# Patient Record
Sex: FEMALE | Race: WHITE | NOT HISPANIC OR LATINO
[De-identification: names, ages, dates, MRNs, and addresses within clinical notes are randomized per-mention and may not be internally consistent; named-entity substitution may affect disease eponyms.]

---

## 2017-05-22 PROBLEM — Z00.00 ENCOUNTER FOR PREVENTIVE HEALTH EXAMINATION: Status: ACTIVE | Noted: 2017-05-22

## 2018-02-20 ENCOUNTER — APPOINTMENT (OUTPATIENT)
Dept: SURGERY | Facility: CLINIC | Age: 58
End: 2018-02-20
Payer: COMMERCIAL

## 2018-02-20 VITALS
WEIGHT: 209 LBS | HEART RATE: 91 BPM | TEMPERATURE: 98.5 F | HEIGHT: 67 IN | SYSTOLIC BLOOD PRESSURE: 135 MMHG | DIASTOLIC BLOOD PRESSURE: 79 MMHG | OXYGEN SATURATION: 98 % | BODY MASS INDEX: 32.8 KG/M2

## 2018-02-20 DIAGNOSIS — Z87.09 PERSONAL HISTORY OF OTHER DISEASES OF THE RESPIRATORY SYSTEM: ICD-10-CM

## 2018-02-20 PROCEDURE — 99202 OFFICE O/P NEW SF 15 MIN: CPT

## 2018-02-20 RX ORDER — ALBUTEROL SULFATE 2.5 MG/.5ML
SOLUTION RESPIRATORY (INHALATION)
Refills: 0 | Status: ACTIVE | COMMUNITY

## 2018-04-13 ENCOUNTER — APPOINTMENT (OUTPATIENT)
Dept: ORTHOPEDIC SURGERY | Facility: CLINIC | Age: 58
End: 2018-04-13

## 2018-04-27 ENCOUNTER — APPOINTMENT (OUTPATIENT)
Dept: ORTHOPEDIC SURGERY | Facility: CLINIC | Age: 58
End: 2018-04-27

## 2018-06-07 ENCOUNTER — APPOINTMENT (OUTPATIENT)
Dept: ORTHOPEDIC SURGERY | Facility: CLINIC | Age: 58
End: 2018-06-07

## 2018-12-10 ENCOUNTER — APPOINTMENT (OUTPATIENT)
Dept: ORTHOPEDIC SURGERY | Facility: CLINIC | Age: 58
End: 2018-12-10
Payer: MEDICAID

## 2018-12-10 VITALS
WEIGHT: 199 LBS | BODY MASS INDEX: 31.23 KG/M2 | SYSTOLIC BLOOD PRESSURE: 130 MMHG | HEIGHT: 67 IN | DIASTOLIC BLOOD PRESSURE: 90 MMHG | HEART RATE: 105 BPM

## 2018-12-10 DIAGNOSIS — Z87.891 PERSONAL HISTORY OF NICOTINE DEPENDENCE: ICD-10-CM

## 2018-12-10 PROCEDURE — 99204 OFFICE O/P NEW MOD 45 MIN: CPT

## 2018-12-10 PROCEDURE — 73030 X-RAY EXAM OF SHOULDER: CPT | Mod: RT

## 2018-12-10 RX ORDER — DICLOFENAC SODIUM 50 MG/1
50 TABLET, DELAYED RELEASE ORAL
Qty: 60 | Refills: 1 | Status: ACTIVE | COMMUNITY
Start: 2018-12-10 | End: 1900-01-01

## 2018-12-10 RX ORDER — BUDESONIDE AND FORMOTEROL FUMARATE DIHYDRATE 80; 4.5 UG/1; UG/1
80-4.5 AEROSOL RESPIRATORY (INHALATION)
Refills: 0 | Status: ACTIVE | COMMUNITY

## 2018-12-31 ENCOUNTER — APPOINTMENT (OUTPATIENT)
Dept: ORTHOPEDIC SURGERY | Facility: CLINIC | Age: 58
End: 2018-12-31

## 2019-01-09 ENCOUNTER — APPOINTMENT (OUTPATIENT)
Dept: ORTHOPEDIC SURGERY | Facility: CLINIC | Age: 59
End: 2019-01-09

## 2019-01-11 ENCOUNTER — APPOINTMENT (OUTPATIENT)
Dept: ORTHOPEDIC SURGERY | Facility: CLINIC | Age: 59
End: 2019-01-11
Payer: MEDICAID

## 2019-01-11 PROCEDURE — 99214 OFFICE O/P EST MOD 30 MIN: CPT

## 2019-01-11 NOTE — DISCUSSION/SUMMARY
[de-identified] : Right shoulder traumatic rotator cuff tear with impingement syndrome and biceps tendinitis\par \par We discussed at length the anatomy, function and tear pattern of the rotator cuff using models, diagrams and drawings. We reviewed the patient's physical exam, radiographic images and history. We discussed the expected outcome of non-operative conservative treatment versus arthroscopic operative rotator cuff repair. Non-operative management consists of patient education, activity modification, corticosteroid injection, PO or topical NSAIDs, and formal physical therapy. Partial thickness tears do have some limited healing potential but infrequently heal completely and with time sometimes progress towards a focal full thickness tear. Smaller tears without retraction are expected to progress and enlarge overtime to larger full thickness tears with retraction. Full thickness rotator cuff tears, in a vast majority of circumstances, do not heal without surgical treatment. The larger the tear becomes, the more difficult the repair is technically and protracts and slows rehabilitation. As the full thickness rotator cuff tear progresses over time, the torn tendon edge retracted due to intrinsic tendon changes to its strength and elasticity, which along with progressive rotator cuff muscle belly atrophy and fatty degeneration makes surgical management both less effective and more difficult. Given enough chronic tendon changes and degenerative muscle changes, the cuff may become unrepairable, necessitating larger, more costly, less predictable poorer outcome, reconstructive arthroscopic or open surgeries including a reverse shoulder replacement.\par \par Physical therapy was prescribed to restore shoulder range of motion preoperative\par \par We'll plan for surgery on January 24 for rotator cuff repair, biceps tenodesis, subacromial decompression\par \par \par The patient verifies their understanding the the visit, diagnosis and plan. They agree with the treatment plan and will contact the office with any questions or problems.\par Follow up\par Patient followup up one week prior to surgery for final discussion

## 2019-01-11 NOTE — HISTORY OF PRESENT ILLNESS
[de-identified] : CC right shoulder\par \par HPI 55 yo female right HD presents with acute onset of on the week of constant pain in the lateral right shoulder after a fall in the bathroom injuring the right shoulder. The pain is same, and rated a 15 out of 10, described as sharp aching burning throat, with radiation to the hand back and neck. Nothing makes the pain better and anything makes the pain worse. The patient reports associated symptoms of instability weakness swelling. The patient + pain at night affecting sleep, + neck pain, and - similar pain previously.\par \par The patient has tried the following treatments:\par Activity modification	+\par Ice			+\par Nsaids    		+\par Physical Therapy  	-\par Cortisone Injection	-\par Arthroscopy/Surgery	-\par \par Review of Systems is positive for the above musculoskeletal symptoms and is otherwise non-contributory for general, constitutional, psychiatric, neurologic, HEENT, cardiac, respiratory, gastrointestinal, reproductive, lymphatic, and dermatologic complaints.\par \par Consult by Dr Chrissy Lombardo

## 2019-01-11 NOTE — PHYSICAL EXAM
[de-identified] : Physical Examination\par General: well nourished, in no acute distress, alert and oriented to person, place and time\par Psychiatric: normal mood and affect, no abnormal movements or speech patterns\par Eyes: vision intact - glasses\par Throat: no thyromegaly\par Lymph: no enlarged nodes, no lymphedema in extremity\par Respiratory: no wheezing, no shortness of breath with ambulation\par Cardiac: no cardiac leg swelling, 2+ peripheral pulses\par Neurology: normal gross sensation in extremities to light touch\par Abdomen: soft, non-tender, tympanic, no masses\par \par Musculoskeletal Examination\par Cervical spine	Full painless range of motion and negative Spurling's test\par \par Shoulder			Right			Left\par Appearance\par      Skin/Swelling/Deformity	normal			normal\par      Scapular Winging		-			-\par Range of Motion\par      Forward Flexion		70 / 120		170 / 170\par      Abduction			20 / 120		170 / 170\par      External Rotation		35			45\par      Internal Rotation		lateral hip			T10\par      SAbd Ext Rotation		90&			90\par      SAbd Int Rotation		80&			80\par      Painful Arc			+			-\par      Crepitus			+			-\par Palpation\par      Clavicle			-			-\par      AC Joint			-			-\par      Posterior Acromion		-			-\par      Levator Scapula		+			-\par      Lateral Bursa			+			-\par      Impingement Area		+			-\par      Biceps Tendon		+			-\par      Anterior Capsule		+			-\par Strength Examination\par      Supraspinatous 		3+ / +			5+ / 0\par      Infraspinatous			3+ / +			5+ / 0\par      Subscapularis			4+ / +			5+ / 0\par      Belly Press			4+ / +			5+ / 0\par      Lift Off			-&			-\par      Drop-Arm			+			-\par Special Examination\par      Biceps Condon's		-&			-\par      Impingement Neer		-&			-\par      Impingement Hawking		-&			-\par \par      AC Cross-Body\par           Anterior			-&			-\par           Posterior			-&			-\par \par Sensation\par      Axillary			normal			normal\par      LatAntCubBrach 		normal			normal\par      Median 			normal			normal\par      Ulnar 			normal			normal\par      Radial 			normal			normal\par Motor\par      AIN 				normal			normal\par      Ulnar 			normal			normal\par      Radial 			normal			normal\par      PIN 				normal			normal\par Pulses\par      Radial			2+			2+ [de-identified] : 4 views of the affected right shoulder (AP, Glenoid, Y-View, Axillary)\par 12-10-18\par demonstrate:\par normal bony calcification without dislocation and no fracture\par 	Arch	2B\par 	AC Joint	no Arthrosis\par 	GH Joint	trace Arthrosis\par 	Calcifications	none\par \par MRI right shoulder from 1/4-19 on [facility]\par My impression of the images:\par Quality of the MRI is okay with motion artifact\par Supraspinatous Tendon full-thickness avulsion tear to the glenohumeral joint\par Infraspinatous Tendon high grade partial thickness tear\par Subscapularis Tendon ok\par Teres Minor Tendon ok\par Muscle Belly Atrophy none\par Biceps Tendon is in the groove and looks ok intra-articularly but poorly visualized with a stable attachment anchor\par Superior Labrum ok\par Anterior Labrum ok\par Posterior Labrum ok\par AC joint mild signal\par There is no full thickness chondral lesion of the glenoid and humeral head\par \par The Final Radiologist Impression:\par Acromion and bursa: The acromion has a curved configuration (type II). Moderate\par degenerative changes are present at the acromioclavicular joint. A moderate amount of\par fluid is present in the subacromial/subdeltoid bursa.\par Rotator cuff tendons: A full-thickness tear is present involving the anterior 1.7 cm of\par the distal supraspinatus tendon. Infraspinatus stenosis is present with high-grade\par partial-thickness articular sided tearing. There is also subscapularis tendinosis with\par partial tearing at its insertion.\par Biceps tendons: Mild tendinosis of the intra-articular portion long head of the biceps\par tendon is present.\par Cartilage and labrum: No full-thickness cartilage defects are present. No overt labral\par tears are identified.\par Bone marrow: The bone marrow signal is overall age appropriate.\par There is no evidence for significant lymphadenopathy in the visualized portions of the\par axilla. No asymmetric muscle atrophy is present.\par IMPRESSION:\par Full-thickness tear involving the anterior aspect of the distal supraspinatus tendon.\par M75.121\par High-grade partial tear of the infraspinatus tendon. Subscapularis tendinosis with\par partial tearing at its insertion. M75.111\par Degeneration of acromioclavicular and glenohumeral joints. M19.011\par Moderate subacromial/subdeltoid bursitis. M75.51\par

## 2019-01-18 ENCOUNTER — OUTPATIENT (OUTPATIENT)
Dept: OUTPATIENT SERVICES | Facility: HOSPITAL | Age: 59
LOS: 1 days | End: 2019-01-18
Payer: MEDICAID

## 2019-01-18 ENCOUNTER — APPOINTMENT (OUTPATIENT)
Dept: ORTHOPEDIC SURGERY | Facility: CLINIC | Age: 59
End: 2019-01-18
Payer: MEDICAID

## 2019-01-18 VITALS
WEIGHT: 216.93 LBS | OXYGEN SATURATION: 96 % | TEMPERATURE: 98 F | HEIGHT: 67 IN | HEART RATE: 93 BPM | DIASTOLIC BLOOD PRESSURE: 84 MMHG | RESPIRATION RATE: 18 BRPM | SYSTOLIC BLOOD PRESSURE: 122 MMHG

## 2019-01-18 DIAGNOSIS — M75.21 BICIPITAL TENDINITIS, RIGHT SHOULDER: ICD-10-CM

## 2019-01-18 DIAGNOSIS — S82.892A OTHER FRACTURE OF LEFT LOWER LEG, INITIAL ENCOUNTER FOR CLOSED FRACTURE: Chronic | ICD-10-CM

## 2019-01-18 DIAGNOSIS — J45.909 UNSPECIFIED ASTHMA, UNCOMPLICATED: ICD-10-CM

## 2019-01-18 DIAGNOSIS — Z01.818 ENCOUNTER FOR OTHER PREPROCEDURAL EXAMINATION: ICD-10-CM

## 2019-01-18 DIAGNOSIS — M75.41 IMPINGEMENT SYNDROME OF RIGHT SHOULDER: ICD-10-CM

## 2019-01-18 DIAGNOSIS — M75.121 COMPLETE ROTATOR CUFF TEAR OR RUPTURE OF RIGHT SHOULDER, NOT SPECIFIED AS TRAUMATIC: ICD-10-CM

## 2019-01-18 PROCEDURE — G0463: CPT

## 2019-01-18 PROCEDURE — 99214 OFFICE O/P EST MOD 30 MIN: CPT

## 2019-01-18 RX ORDER — STANDARDIZED SENNA CONCENTRATE 8.6 MG/1
8.6 TABLET ORAL
Qty: 30 | Refills: 0 | Status: ACTIVE | COMMUNITY
Start: 2019-01-18 | End: 1900-01-01

## 2019-01-18 RX ORDER — CEPHALEXIN 500 MG/1
500 TABLET ORAL
Qty: 9 | Refills: 0 | Status: ACTIVE | COMMUNITY
Start: 2019-01-18 | End: 1900-01-01

## 2019-01-18 RX ORDER — OXYCODONE AND ACETAMINOPHEN 5; 325 MG/1; MG/1
5-325 TABLET ORAL
Qty: 42 | Refills: 0 | Status: ACTIVE | COMMUNITY
Start: 2019-01-18 | End: 1900-01-01

## 2019-01-18 RX ORDER — DOCUSATE SODIUM 100 MG/1
100 CAPSULE, LIQUID FILLED ORAL TWICE DAILY
Qty: 50 | Refills: 0 | Status: COMPLETED | COMMUNITY
Start: 2019-01-18 | End: 2019-02-12

## 2019-01-18 RX ORDER — ASPIRIN 325 MG/1
325 TABLET, FILM COATED ORAL
Qty: 28 | Refills: 0 | Status: COMPLETED | COMMUNITY
Start: 2019-01-18 | End: 2019-02-17

## 2019-01-18 NOTE — H&P PST ADULT - PROBLEM SELECTOR PLAN 4
Instructed to use inhaler as needed the day of surgery and f/u with PCP post-surgery for management of Asthma. Patient agrees to bring inhaler (s) to hospital the day of surgery

## 2019-01-18 NOTE — H&P PST ADULT - PROBLEM SELECTOR PLAN 1
Scheduled for right shoulder arthroscopy, rotator cuff repair, subacromial decompression, possible biceps tenodesis versus tenotomy 1/24/2019

## 2019-01-18 NOTE — H&P PST ADULT - PSH
Closed fracture of left ankle, initial encounter  s/p ORIF 10/12/2018  removal of hardware from left ankle 11/2018

## 2019-01-18 NOTE — H&P PST ADULT - NSANTHOSAYNRD_GEN_A_CORE
No. GERMAIN screening performed.  STOP BANG Legend: 0-2 = LOW Risk; 3-4 = INTERMEDIATE Risk; 5-8 = HIGH Risk

## 2019-01-18 NOTE — H&P PST ADULT - PMH
Chronic asthma without complication, unspecified asthma severity, unspecified whether persistent    Chronic right shoulder pain

## 2019-01-18 NOTE — DISCUSSION/SUMMARY
[de-identified] : Right shoulder traumatic rotator cuff tear with impingement syndrome and biceps tendinitis\par \par We discussed at length the anatomy, function and tear pattern of the rotator cuff using models, diagrams and drawings. We reviewed the patient's physical exam, radiographic images and history. We discussed the expected outcome of non-operative conservative treatment versus arthroscopic operative rotator cuff repair. Non-operative management consists of patient education, activity modification, corticosteroid injection, PO or topical NSAIDs, and formal physical therapy. Partial thickness tears do have some limited healing potential but infrequently heal completely and with time sometimes progress towards a focal full thickness tear. Smaller tears without retraction are expected to progress and enlarge overtime to larger full thickness tears with retraction. Full thickness rotator cuff tears, in a vast majority of circumstances, do not heal without surgical treatment. The larger the tear becomes, the more difficult the repair is technically and protracts and slows rehabilitation. As the full thickness rotator cuff tear progresses over time, the torn tendon edge retracted due to intrinsic tendon changes to its strength and elasticity, which along with progressive rotator cuff muscle belly atrophy and fatty degeneration makes surgical management both less effective and more difficult. Given enough chronic tendon changes and degenerative muscle changes, the cuff may become unrepairable, necessitating larger, more costly, less predictable poorer outcome, reconstructive arthroscopic or open surgeries including a reverse shoulder replacement.\par \par We'll plan for surgery on January 24 for rotator cuff repair, biceps tenodesis, subacromial decompression\par \par I have discussed the treatment options with Ms. OTTO . At this point she remains symptomatic despite trial of non-operative management. She is having persistent symptoms and has been unable to return to her usual and customary occupation. In light of the patient's complaints, my recommendation at this point is to proceed with surgical arthroscopy of the right shoulder with examination under anesthesia, arthroscopic subacromial decompression, possible mini- Luciano versus Luciano resection of the distal clavicle, careful evaluation of the long head of the biceps for any necessary debridement versus tenodesis/tenotomy, careful evaluation of the rotator cuff with debridement versus repair. I have explained the nature of the surgery with images, diagrams and models as well as the expected postoperative course including immobilization in a surgical sling and intensive physical therapy.. The patient is advised of the risks and benefits and alternatives of surgery. Risks include, but were not limited to future surgeries, infection, bleeding, damage to blood vessels and nerves, continued pain, inability to complete tear repair due to size and condition of the tear, failure of cuff repair to heal, re-tear of the rotator cuff, loss of motion, deep venous thrombosis/pulmonary embolus, acromial stress fracture, AC joint instability, nerve injury, iatrogenic injury to shoulder structures, positional complications including eye irritation and compression, complications due to anesthesia including myocardial infarction, stroke, death, etc.The Ms. OTTO's questions were answered. She appeared to understand the risks, benefits, and alternatives of the surgical procedure. I advised that there are no guarantees as to outcome and that the ultimate improvement will be largely based on the extent of rotator cuff and long head biceps involvement and that she may not gain full strength nor full range motion of the shoulder Ms. OTTO  gave written and verbal consent to proceed.\par \par Ms. OTTO denies personal or family history of DVT/blood clots, has been ambulatory, not taking OCP and does not smoke. Nop risk factors for DVT/PE, will prescribe [id number] daily for 4 weeks postoperatively beginning POD 1.\par \par She was  instructed on signs and symptoms of DVT/PE including lower leg swelling, calf cramp like pain, difficulty breaking, shortness of breath and chest pain. They will proceed immediately to the ER if any of these symptoms occur and contact me.\par \par She was prescribed a 3 day course of oral antibiotics to prevent against surgical infection. Instructed to start after returning home from surgery when able to tolerate PO food intake. This was prescribed to decrease the possibility of postop infection.\par \par For post operative pain control, the patient was given a prescription for percocet 5/325 1-2 every 4-6 hour to not exceed 4g tylenol per 24 hour period. I recommend taking medication as necessary postop for pain control. Taking NSAIDs such as Advil or Aleve is also ok with the narcotic pain medication. I did warn Ms. OTTO against concomitant use of Tylenol since the narcotic pain medication includes Tylenol already, as taking both together can injure the liver.\par \par The patient verifies their understanding the the visit, diagnosis and plan. They agree with the treatment plan and will contact the office with any questions or problems.\par Follow up\par 1 week postop

## 2019-01-18 NOTE — HISTORY OF PRESENT ILLNESS
[de-identified] : CC right shoulder\par \par HPI 55 yo female right HD presents for op discussion acute onset of on the week of constant pain in the lateral right shoulder after a fall in the bathroom injuring the right shoulder. The pain is same, and rated a 15 out of 10, described as sharp aching burning throat, with radiation to the hand back and neck. Nothing makes the pain better and anything makes the pain worse. The patient reports associated symptoms of instability weakness swelling. The patient + pain at night affecting sleep, + neck pain, and - similar pain previously.\par \par The patient has tried the following treatments:\par Activity modification	+\par Ice			+\par Nsaids    		+\par Physical Therapy  	-\par Cortisone Injection	-\par Arthroscopy/Surgery	-\par \par Review of Systems is positive for the above musculoskeletal symptoms and is otherwise non-contributory for general, constitutional, psychiatric, neurologic, HEENT, cardiac, respiratory, gastrointestinal, reproductive, lymphatic, and dermatologic complaints.\par \par Consult by Dr Chrissy Lombardo

## 2019-01-18 NOTE — H&P PST ADULT - NEGATIVE CARDIOVASCULAR SYMPTOMS
no chest pain/no dyspnea on exertion/no orthopnea/no paroxysmal nocturnal dyspnea/no palpitations/no peripheral edema/no claudication

## 2019-01-18 NOTE — H&P PST ADULT - MUSCULOSKELETAL
detailed exam decreased ROM due to pain/diminished strength/RUE, restricted FROM, unable to elevate RUE above head details…

## 2019-01-18 NOTE — H&P PST ADULT - ASSESSMENT
55 y/o female with PMH of chronic asthma, controlled on medication, last exacerbation 2017, diagnosed with impingement syndrome of right shoulder, complete rotator cuff tear or rupture of right shoulder, bicipital tendinitis right shoulder. Patient is scheduled for right shoulder arthroscopy, rotator cuff repair, subacromial decompression, possible biceps tenodesis versus tenotomy 1/24/2019

## 2019-01-18 NOTE — H&P PST ADULT - NEGATIVE GENERAL SYMPTOMS
no polydipsia/no fatigue/no fever/no polyuria/no malaise/no sweating/no anorexia/no weight loss/no polyphagia

## 2019-01-18 NOTE — H&P PST ADULT - PRO ANTICIPATED DISCH DISP
Date: 6/19/2024    Patient Name: Alesia Bland          To Whom it may concern:    The above patient was seen at [facility] for treatment of a medical condition.    This patient should be excused from attending work from 06/09/2024 through 06/19/2024.    The patient may return to work on 06/20/2024 with the following limitations may use cane.        Sincerely,    BRE Jones       home

## 2019-01-18 NOTE — H&P PST ADULT - RS GEN PE MLT RESP DETAILS PC
no rhonchi/no chest wall tenderness/clear to auscultation bilaterally/no intercostal retractions/no rales/no subcutaneous emphysema/no wheezes/respirations non-labored

## 2019-01-18 NOTE — H&P PST ADULT - HISTORY OF PRESENT ILLNESS
55 y/o female with PMH of chronic asthma, last exacerbation 2017, presents to PST for presurgical evaluation. States she fell at home in October 2018, injuring her right shoulder. She was diagnosed with impingement syndrome of right shoulder, complete rotator cuff tear or rupture of right shoulder, bicipital tendinitis right shoulder. P 55 y/o female with PMH of chronic asthma, last exacerbation 2017, presents to [facility] for presurgical evaluation. States she fell at home in October 2018, injuring her right shoulder. She was diagnosed with impingement syndrome of right shoulder, complete rotator cuff tear or rupture of right shoulder, bicipital tendinitis right shoulder. Patient is scheduled for right shoulder arthroscopy, rotator cuff repair, subacromial decompression, possible biceps tenodesis versus tenotomy 1/24/2019

## 2019-01-18 NOTE — PHYSICAL EXAM
[de-identified] : Physical Examination\par General: well nourished, in no acute distress, alert and oriented to person, place and time\par Psychiatric: normal mood and affect, no abnormal movements or speech patterns\par Eyes: vision intact - glasses\par Throat: no thyromegaly\par Lymph: no enlarged nodes, no lymphedema in extremity\par Respiratory: no wheezing, no shortness of breath with ambulation\par Cardiac: no cardiac leg swelling, 2+ peripheral pulses\par Neurology: normal gross sensation in extremities to light touch\par Abdomen: soft, non-tender, tympanic, no masses\par \par Musculoskeletal Examination\par Cervical spine	Full painless range of motion and negative Spurling's test\par \par Shoulder			Right			Left\par Appearance\par      Skin/Swelling/Deformity	normal			normal\par      Scapular Winging		-			-\par Range of Motion\par      Forward Flexion		70 / 150		170 / 170\par      Abduction			20 / 150		170 / 170\par      External Rotation		35			45\par      Internal Rotation		lateral hip			T10\par      SAbd Ext Rotation		90&			90\par      SAbd Int Rotation		80&			80\par      Painful Arc			+			-\par      Crepitus			+			-\par Palpation\par      Clavicle			-			-\par      AC Joint			-			-\par      Posterior Acromion		-			-\par      Levator Scapula		+			-\par      Lateral Bursa			+			-\par      Impingement Area		+			-\par      Biceps Tendon		+			-\par      Anterior Capsule		+			-\par Strength Examination\par      Supraspinatous 		3+ / +			5+ / 0\par      Infraspinatous			3+ / +			5+ / 0\par      Subscapularis			4+ / +			5+ / 0\par      Belly Press			4+ / +			5+ / 0\par      Lift Off			-&			-\par      Drop-Arm			+			-\par Special Examination\par      Biceps Parkton's		-&			-\par      Impingement Neer		-&			-\par      Impingement Hawking		-&			-\par \par      AC Cross-Body\par           Anterior			-&			-\par           Posterior			-&			-\par \par Sensation\par      Axillary			normal			normal\par      LatAntCubBrach 		normal			normal\par      Median 			normal			normal\par      Ulnar 			normal			normal\par      Radial 			normal			normal\par Motor\par      AIN 				normal			normal\par      Ulnar 			normal			normal\par      Radial 			normal			normal\par      PIN 				normal			normal\par Pulses\par      Radial			2+			2+ [de-identified] : 4 views of the affected right shoulder (AP, Glenoid, Y-View, Axillary)\par 12-10-18\par demonstrate:\par normal bony calcification without dislocation and no fracture\par 	Arch	2B\par 	AC Joint	no Arthrosis\par 	GH Joint	trace Arthrosis\par 	Calcifications	none\par \par MRI right shoulder from 1/4-19 on [facility]\par My impression of the images:\par Quality of the MRI is okay with motion artifact\par Supraspinatous Tendon full-thickness avulsion tear to the glenohumeral joint\par Infraspinatous Tendon high grade partial thickness tear\par Subscapularis Tendon ok\par Teres Minor Tendon ok\par Muscle Belly Atrophy none\par Biceps Tendon is in the groove and looks ok intra-articularly but poorly visualized with a stable attachment anchor\par Superior Labrum ok\par Anterior Labrum ok\par Posterior Labrum ok\par AC joint mild signal\par There is no full thickness chondral lesion of the glenoid and humeral head\par \par The Final Radiologist Impression:\par Acromion and bursa: The acromion has a curved configuration (type II). Moderate\par degenerative changes are present at the acromioclavicular joint. A moderate amount of\par fluid is present in the subacromial/subdeltoid bursa.\par Rotator cuff tendons: A full-thickness tear is present involving the anterior 1.7 cm of\par the distal supraspinatus tendon. Infraspinatus stenosis is present with high-grade\par partial-thickness articular sided tearing. There is also subscapularis tendinosis with\par partial tearing at its insertion.\par Biceps tendons: Mild tendinosis of the intra-articular portion long head of the biceps\par tendon is present.\par Cartilage and labrum: No full-thickness cartilage defects are present. No overt labral\par tears are identified.\par Bone marrow: The bone marrow signal is overall age appropriate.\par There is no evidence for significant lymphadenopathy in the visualized portions of the\par axilla. No asymmetric muscle atrophy is present.\par IMPRESSION:\par Full-thickness tear involving the anterior aspect of the distal supraspinatus tendon.\par M75.121\par High-grade partial tear of the infraspinatus tendon. Subscapularis tendinosis with\par partial tearing at its insertion. M75.111\par Degeneration of acromioclavicular and glenohumeral joints. M19.011\par Moderate subacromial/subdeltoid bursitis. M75.51\par

## 2019-01-24 ENCOUNTER — APPOINTMENT (OUTPATIENT)
Dept: ORTHOPEDIC SURGERY | Facility: HOSPITAL | Age: 59
End: 2019-01-24

## 2019-03-18 PROBLEM — M25.511 PAIN IN RIGHT SHOULDER: Chronic | Status: ACTIVE | Noted: 2019-01-18

## 2019-03-18 PROBLEM — J45.909 UNSPECIFIED ASTHMA, UNCOMPLICATED: Chronic | Status: ACTIVE | Noted: 2019-01-18

## 2019-03-20 ENCOUNTER — APPOINTMENT (OUTPATIENT)
Dept: ORTHOPEDIC SURGERY | Facility: CLINIC | Age: 59
End: 2019-03-20

## 2019-03-22 ENCOUNTER — OUTPATIENT (OUTPATIENT)
Dept: OUTPATIENT SERVICES | Facility: HOSPITAL | Age: 59
LOS: 1 days | End: 2019-03-22
Payer: MEDICAID

## 2019-03-22 VITALS
SYSTOLIC BLOOD PRESSURE: 120 MMHG | RESPIRATION RATE: 16 BRPM | WEIGHT: 216.93 LBS | HEART RATE: 78 BPM | TEMPERATURE: 98 F | HEIGHT: 67 IN | OXYGEN SATURATION: 98 % | DIASTOLIC BLOOD PRESSURE: 78 MMHG

## 2019-03-22 DIAGNOSIS — M75.121 COMPLETE ROTATOR CUFF TEAR OR RUPTURE OF RIGHT SHOULDER, NOT SPECIFIED AS TRAUMATIC: ICD-10-CM

## 2019-03-22 DIAGNOSIS — M75.41 IMPINGEMENT SYNDROME OF RIGHT SHOULDER: ICD-10-CM

## 2019-03-22 DIAGNOSIS — S82.892A OTHER FRACTURE OF LEFT LOWER LEG, INITIAL ENCOUNTER FOR CLOSED FRACTURE: Chronic | ICD-10-CM

## 2019-03-22 DIAGNOSIS — M75.21 BICIPITAL TENDINITIS, RIGHT SHOULDER: ICD-10-CM

## 2019-03-22 DIAGNOSIS — Z01.818 ENCOUNTER FOR OTHER PREPROCEDURAL EXAMINATION: ICD-10-CM

## 2019-03-22 LAB
ANION GAP SERPL CALC-SCNC: 7 MMOL/L — SIGNIFICANT CHANGE UP (ref 5–17)
BUN SERPL-MCNC: 15 MG/DL — SIGNIFICANT CHANGE UP (ref 7–18)
CALCIUM SERPL-MCNC: 9.4 MG/DL — SIGNIFICANT CHANGE UP (ref 8.4–10.5)
CHLORIDE SERPL-SCNC: 104 MMOL/L — SIGNIFICANT CHANGE UP (ref 96–108)
CO2 SERPL-SCNC: 24 MMOL/L — SIGNIFICANT CHANGE UP (ref 22–31)
CREAT SERPL-MCNC: 0.78 MG/DL — SIGNIFICANT CHANGE UP (ref 0.5–1.3)
GLUCOSE SERPL-MCNC: 163 MG/DL — HIGH (ref 70–99)
HCT VFR BLD CALC: 40.9 % — SIGNIFICANT CHANGE UP (ref 34.5–45)
HGB BLD-MCNC: 13.7 G/DL — SIGNIFICANT CHANGE UP (ref 11.5–15.5)
MCHC RBC-ENTMCNC: 30.9 PG — SIGNIFICANT CHANGE UP (ref 27–34)
MCHC RBC-ENTMCNC: 33.5 GM/DL — SIGNIFICANT CHANGE UP (ref 32–36)
MCV RBC AUTO: 92.3 FL — SIGNIFICANT CHANGE UP (ref 80–100)
NRBC # BLD: 0 /100 WBCS — SIGNIFICANT CHANGE UP (ref 0–0)
PLATELET # BLD AUTO: 289 K/UL — SIGNIFICANT CHANGE UP (ref 150–400)
POTASSIUM SERPL-MCNC: 4.1 MMOL/L — SIGNIFICANT CHANGE UP (ref 3.5–5.3)
POTASSIUM SERPL-SCNC: 4.1 MMOL/L — SIGNIFICANT CHANGE UP (ref 3.5–5.3)
RBC # BLD: 4.43 M/UL — SIGNIFICANT CHANGE UP (ref 3.8–5.2)
RBC # FLD: 12.3 % — SIGNIFICANT CHANGE UP (ref 10.3–14.5)
SODIUM SERPL-SCNC: 135 MMOL/L — SIGNIFICANT CHANGE UP (ref 135–145)
WBC # BLD: 10.47 K/UL — SIGNIFICANT CHANGE UP (ref 3.8–10.5)
WBC # FLD AUTO: 10.47 K/UL — SIGNIFICANT CHANGE UP (ref 3.8–10.5)

## 2019-03-22 PROCEDURE — 36415 COLL VENOUS BLD VENIPUNCTURE: CPT

## 2019-03-22 PROCEDURE — 83036 HEMOGLOBIN GLYCOSYLATED A1C: CPT

## 2019-03-22 PROCEDURE — 80048 BASIC METABOLIC PNL TOTAL CA: CPT

## 2019-03-22 PROCEDURE — 85027 COMPLETE CBC AUTOMATED: CPT

## 2019-03-22 PROCEDURE — G0463: CPT

## 2019-03-22 NOTE — H&P PST ADULT - MUSCULOSKELETAL
details… detailed exam decreased ROM due to pain/right upper extremity limited ROM, unable to elevate RUE above head/diminished strength

## 2019-03-22 NOTE — H&P PST ADULT - NEGATIVE GENERAL SYMPTOMS
no weight loss/no weight gain/no polyphagia/no polydipsia/no fatigue/no sweating/no anorexia/no polyuria/no malaise/no fever

## 2019-03-22 NOTE — H&P PST ADULT - HISTORY OF PRESENT ILLNESS
56 y/o female with PMH of chronic asthma, last exacerbation 2017, presents to [facility] for presurgical evaluation. States she fell at home in October 2018, injuring her right shoulder. She was diagnosed with impingement syndrome of right shoulder, complete rotator cuff tear or rupture of right shoulder, bicipital tendinitis right shoulder. Patient is scheduled for right shoulder arthroscopy, rotator cuff repair, subacromial decompression, possible biceps tenodesis, possible tenotomy on 3/26/19

## 2019-03-22 NOTE — H&P PST ADULT - NEGATIVE CARDIOVASCULAR SYMPTOMS
no dyspnea on exertion/no peripheral edema/no orthopnea/no paroxysmal nocturnal dyspnea/no palpitations/no claudication/no chest pain

## 2019-03-22 NOTE — H&P PST ADULT - NSICDXPASTMEDICALHX_GEN_ALL_CORE_FT
PAST MEDICAL HISTORY:  Chronic asthma without complication, unspecified asthma severity, unspecified whether persistent     Chronic right shoulder pain     Class 1 obesity with body mass index (BMI) of 34.0 to 34.9 in adult     Prediabetes

## 2019-03-22 NOTE — H&P PST ADULT - NEGATIVE OPHTHALMOLOGIC SYMPTOMS
no blurred vision L/no blurred vision R/no lacrimation L/no lacrimation R/no diplopia/no photophobia

## 2019-03-22 NOTE — H&P PST ADULT - NSICDXPROBLEM_GEN_ALL_CORE_FT
PROBLEM DIAGNOSES  Problem: Impingement syndrome of shoulder, right  Assessment and Plan: Patient is scheduled for right shoulder arthroscopy, rotator cuff repair, subacromial decompression, possible biceps tenodesis versus tenotomy on 3/26/19.     Problem: Complete rotator cuff tear or rupture of right shoulder, not specified as traumatic  Assessment and Plan: Patient is scheduled for right shoulder arthroscopy, rotator cuff repair, subacromial decompression, possible biceps tenodesis versus tenotomy on 3/26/19.       Problem: Bicipital tendinitis, right shoulder  Assessment and Plan: Patient is scheduled for right shoulder arthroscopy, rotator cuff repair, subacromial decompression, possible biceps tenodesis versus tenotomy on 3/26/19.

## 2019-03-22 NOTE — H&P PST ADULT - NSICDXPASTSURGICALHX_GEN_ALL_CORE_FT
PAST SURGICAL HISTORY:  Closed fracture of left ankle, initial encounter s/p ORIF 10/12/2018  removal of hardware from left ankle 11/2018

## 2019-03-22 NOTE — H&P PST ADULT - ASSESSMENT
58 y/o female with PMH of chronic asthma, controlled on medication, last exacerbation 2017, diagnosed with impingement syndrome of right shoulder, complete rotator cuff tear or rupture of right shoulder, bicipital tendinitis right shoulder. Patient is scheduled for right shoulder arthroscopy, rotator cuff repair, subacromial decompression, possible biceps tenodesis versus tenotomy 1/24/2019

## 2019-03-22 NOTE — H&P PST ADULT - RS GEN PE MLT RESP DETAILS PC
good air movement/clear to auscultation bilaterally/breath sounds equal/no rales/no subcutaneous emphysema/no chest wall tenderness/no intercostal retractions/no rhonchi/no wheezes/respirations non-labored

## 2019-03-23 LAB — HBA1C BLD-MCNC: 7.4 % — HIGH (ref 4–5.6)

## 2019-03-26 ENCOUNTER — APPOINTMENT (OUTPATIENT)
Dept: ORTHOPEDIC SURGERY | Facility: HOSPITAL | Age: 59
End: 2019-03-26

## 2019-04-16 PROBLEM — R73.03 PREDIABETES: Chronic | Status: ACTIVE | Noted: 2019-03-22

## 2019-04-16 PROBLEM — E66.9 OBESITY, UNSPECIFIED: Chronic | Status: ACTIVE | Noted: 2019-03-22

## 2019-04-23 ENCOUNTER — OUTPATIENT (OUTPATIENT)
Dept: OUTPATIENT SERVICES | Facility: HOSPITAL | Age: 59
LOS: 1 days | End: 2019-04-23
Payer: MEDICAID

## 2019-04-23 VITALS
RESPIRATION RATE: 18 BRPM | SYSTOLIC BLOOD PRESSURE: 124 MMHG | HEART RATE: 90 BPM | TEMPERATURE: 98 F | OXYGEN SATURATION: 97 % | WEIGHT: 205.91 LBS | HEIGHT: 67 IN | DIASTOLIC BLOOD PRESSURE: 83 MMHG

## 2019-04-23 DIAGNOSIS — S82.892A OTHER FRACTURE OF LEFT LOWER LEG, INITIAL ENCOUNTER FOR CLOSED FRACTURE: Chronic | ICD-10-CM

## 2019-04-23 DIAGNOSIS — M75.121 COMPLETE ROTATOR CUFF TEAR OR RUPTURE OF RIGHT SHOULDER, NOT SPECIFIED AS TRAUMATIC: ICD-10-CM

## 2019-04-23 DIAGNOSIS — Z01.818 ENCOUNTER FOR OTHER PREPROCEDURAL EXAMINATION: ICD-10-CM

## 2019-04-23 DIAGNOSIS — M75.41 IMPINGEMENT SYNDROME OF RIGHT SHOULDER: ICD-10-CM

## 2019-04-23 DIAGNOSIS — M75.21 BICIPITAL TENDINITIS, RIGHT SHOULDER: ICD-10-CM

## 2019-04-23 PROCEDURE — G0463: CPT

## 2019-04-23 RX ORDER — SODIUM CHLORIDE 9 MG/ML
3 INJECTION INTRAMUSCULAR; INTRAVENOUS; SUBCUTANEOUS EVERY 8 HOURS
Qty: 0 | Refills: 0 | Status: DISCONTINUED | OUTPATIENT
Start: 2019-04-25 | End: 2019-05-03

## 2019-04-23 RX ORDER — BUDESONIDE AND FORMOTEROL FUMARATE DIHYDRATE 160; 4.5 UG/1; UG/1
2 AEROSOL RESPIRATORY (INHALATION)
Qty: 0 | Refills: 0 | COMMUNITY

## 2019-04-23 RX ORDER — ACETAMINOPHEN 500 MG
2 TABLET ORAL
Qty: 0 | Refills: 0 | COMMUNITY

## 2019-04-23 NOTE — H&P PST ADULT - MUSCULOSKELETAL
details… detailed exam decreased ROM due to pain/diminished strength/right upper extremity limited ROM, unable to elevate RUE above head, right foot inversion ( from birth), right shoulder tenderness to palpation

## 2019-04-23 NOTE — H&P PST ADULT - MS GEN HX ROS MEA POS PC
Number Of Freeze-Thaw Cycles: 1 freeze-thaw cycle Aperture Size (Optional): C Consent: The patient's consent was obtained including but not limited to risks of crusting, scabbing, blistering, scarring, darker or lighter pigmentary change, recurrence, incomplete removal and infection. Detail Level: Detailed Render Post-Care Instructions In Note?: no Post-Care Instructions: I reviewed with the patient in detail post-care instructions. Patient is to wear sunprotection, and avoid picking at any of the treated lesions. Pt may apply Vaseline to crusted or scabbing areas.  They were told if any of the lesions fail to resolve to f/u and have them evaluated. Duration Of Freeze Thaw-Cycle (Seconds): 1 right shoulder pain/arm pain R/stiffness/joint pain

## 2019-04-23 NOTE — H&P PST ADULT - NEGATIVE GENERAL SYMPTOMS
no weight loss/no polyphagia/no polydipsia/no fatigue/no sweating/no fever/no anorexia/no weight gain/no polyuria/no malaise

## 2019-04-23 NOTE — H&P PST ADULT - MUSCULOSKELETAL COMMENTS
Impingement syndrome of right shoulder, complete rotator cuff tear or rupture of right shoulder, bicipital tendinitis right shoulder.

## 2019-04-23 NOTE — H&P PST ADULT - NSICDXPROBLEM_GEN_ALL_CORE_FT
PROBLEM DIAGNOSES  Problem: Impingement syndrome of shoulder, right  Assessment and Plan: Patient is scheduled for right shoulder arthroscopy, rotator cuff repair, subacromial decompression, possible biceps tenodesis possible tenotomy on 4/25/19    Problem: Complete rotator cuff tear or rupture of right shoulder, not specified as traumatic  Assessment and Plan: Patient is scheduled for right shoulder arthroscopy, rotator cuff repair, subacromial decompression, possible biceps tenodesis possible tenotomy on 4/25/19      Problem: Bicipital tendinitis, right shoulder  Assessment and Plan: Patient is scheduled for right shoulder arthroscopy, rotator cuff repair, subacromial decompression, possible biceps tenodesis possible tenotomy on 4/25/19

## 2019-04-24 ENCOUNTER — TRANSCRIPTION ENCOUNTER (OUTPATIENT)
Age: 59
End: 2019-04-24

## 2019-04-25 ENCOUNTER — OUTPATIENT (OUTPATIENT)
Dept: OUTPATIENT SERVICES | Facility: HOSPITAL | Age: 59
LOS: 1 days | End: 2019-04-25
Payer: MEDICAID

## 2019-04-25 ENCOUNTER — APPOINTMENT (OUTPATIENT)
Dept: ORTHOPEDIC SURGERY | Facility: HOSPITAL | Age: 59
End: 2019-04-25

## 2019-04-25 VITALS
SYSTOLIC BLOOD PRESSURE: 138 MMHG | RESPIRATION RATE: 18 BRPM | OXYGEN SATURATION: 96 % | HEIGHT: 67 IN | WEIGHT: 201.94 LBS | TEMPERATURE: 98 F | HEART RATE: 83 BPM | DIASTOLIC BLOOD PRESSURE: 84 MMHG

## 2019-04-25 VITALS
TEMPERATURE: 98 F | OXYGEN SATURATION: 95 % | RESPIRATION RATE: 16 BRPM | SYSTOLIC BLOOD PRESSURE: 122 MMHG | DIASTOLIC BLOOD PRESSURE: 70 MMHG | HEART RATE: 65 BPM

## 2019-04-25 DIAGNOSIS — M75.121 COMPLETE ROTATOR CUFF TEAR OR RUPTURE OF RIGHT SHOULDER, NOT SPECIFIED AS TRAUMATIC: ICD-10-CM

## 2019-04-25 DIAGNOSIS — S82.892A OTHER FRACTURE OF LEFT LOWER LEG, INITIAL ENCOUNTER FOR CLOSED FRACTURE: Chronic | ICD-10-CM

## 2019-04-25 DIAGNOSIS — M75.21 BICIPITAL TENDINITIS, RIGHT SHOULDER: ICD-10-CM

## 2019-04-25 DIAGNOSIS — M75.41 IMPINGEMENT SYNDROME OF RIGHT SHOULDER: ICD-10-CM

## 2019-04-25 DIAGNOSIS — Z01.818 ENCOUNTER FOR OTHER PREPROCEDURAL EXAMINATION: ICD-10-CM

## 2019-04-25 PROCEDURE — 29827 SHO ARTHRS SRG RT8TR CUF RPR: CPT | Mod: RT

## 2019-04-25 PROCEDURE — 29821 SHO ARTHRS SRG COMPL SYNVCT: CPT | Mod: AS,59

## 2019-04-25 PROCEDURE — 29827 SHO ARTHRS SRG RT8TR CUF RPR: CPT | Mod: AS

## 2019-04-25 PROCEDURE — 29828 SHO ARTHRS SRG BICP TENODSIS: CPT | Mod: RT

## 2019-04-25 PROCEDURE — 29828 SHO ARTHRS SRG BICP TENODSIS: CPT | Mod: AS

## 2019-04-25 PROCEDURE — C1713: CPT

## 2019-04-25 PROCEDURE — 82962 GLUCOSE BLOOD TEST: CPT

## 2019-04-25 PROCEDURE — 29826 SHO ARTHRS SRG DECOMPRESSION: CPT | Mod: RT

## 2019-04-25 RX ORDER — OXYCODONE AND ACETAMINOPHEN 5; 325 MG/1; MG/1
1 TABLET ORAL EVERY 4 HOURS
Qty: 0 | Refills: 0 | Status: DISCONTINUED | OUTPATIENT
Start: 2019-04-25 | End: 2019-04-25

## 2019-04-25 RX ORDER — OXYCODONE AND ACETAMINOPHEN 5; 325 MG/1; MG/1
2 TABLET ORAL EVERY 6 HOURS
Qty: 0 | Refills: 0 | Status: DISCONTINUED | OUTPATIENT
Start: 2019-04-25 | End: 2019-04-25

## 2019-04-25 RX ORDER — CELECOXIB 200 MG/1
200 CAPSULE ORAL ONCE
Qty: 0 | Refills: 0 | Status: COMPLETED | OUTPATIENT
Start: 2019-04-25 | End: 2019-04-25

## 2019-04-25 RX ORDER — ONDANSETRON 8 MG/1
4 TABLET, FILM COATED ORAL EVERY 6 HOURS
Qty: 0 | Refills: 0 | Status: DISCONTINUED | OUTPATIENT
Start: 2019-04-25 | End: 2019-05-03

## 2019-04-25 RX ORDER — SODIUM CHLORIDE 9 MG/ML
1000 INJECTION, SOLUTION INTRAVENOUS
Qty: 0 | Refills: 0 | Status: DISCONTINUED | OUTPATIENT
Start: 2019-04-25 | End: 2019-04-25

## 2019-04-25 RX ORDER — DOCUSATE SODIUM 100 MG
1 CAPSULE ORAL
Qty: 21 | Refills: 0 | OUTPATIENT
Start: 2019-04-25 | End: 2019-05-01

## 2019-04-25 RX ORDER — ACETAMINOPHEN 500 MG
975 TABLET ORAL ONCE
Qty: 0 | Refills: 0 | Status: COMPLETED | OUTPATIENT
Start: 2019-04-25 | End: 2019-04-25

## 2019-04-25 RX ORDER — ALBUTEROL 90 UG/1
2 AEROSOL, METERED ORAL EVERY 6 HOURS
Qty: 0 | Refills: 0 | Status: DISCONTINUED | OUTPATIENT
Start: 2019-04-25 | End: 2019-05-03

## 2019-04-25 RX ORDER — ASPIRIN/CALCIUM CARB/MAGNESIUM 324 MG
1 TABLET ORAL
Qty: 30 | Refills: 0 | OUTPATIENT
Start: 2019-04-25 | End: 2019-05-24

## 2019-04-25 RX ORDER — HYDROMORPHONE HYDROCHLORIDE 2 MG/ML
0.5 INJECTION INTRAMUSCULAR; INTRAVENOUS; SUBCUTANEOUS
Qty: 0 | Refills: 0 | Status: DISCONTINUED | OUTPATIENT
Start: 2019-04-25 | End: 2019-04-25

## 2019-04-25 RX ORDER — ALBUTEROL 90 UG/1
2 AEROSOL, METERED ORAL
Qty: 0 | Refills: 0 | COMMUNITY

## 2019-04-25 RX ORDER — CEPHALEXIN 500 MG
1 CAPSULE ORAL
Qty: 9 | Refills: 0 | OUTPATIENT
Start: 2019-04-25 | End: 2019-04-27

## 2019-04-25 RX ORDER — SENNA PLUS 8.6 MG/1
2 TABLET ORAL
Qty: 14 | Refills: 0 | OUTPATIENT
Start: 2019-04-25 | End: 2019-05-01

## 2019-04-25 RX ADMIN — CELECOXIB 200 MILLIGRAM(S): 200 CAPSULE ORAL at 12:20

## 2019-04-25 RX ADMIN — Medication 975 MILLIGRAM(S): at 12:21

## 2019-04-25 NOTE — ASU PATIENT PROFILE, ADULT - PMH
Chronic asthma without complication, unspecified asthma severity, unspecified whether persistent    Chronic right shoulder pain    Class 1 obesity with body mass index (BMI) of 34.0 to 34.9 in adult    GERMAIN (obstructive sleep apnea)  pt never had sleep apnea test done, not on machine  Prediabetes

## 2019-04-25 NOTE — BRIEF OPERATIVE NOTE - NSICDXBRIEFPREOP_GEN_ALL_CORE_FT
PRE-OP DIAGNOSIS:  Shoulder impingement, right 25-Apr-2019 16:59:24  Melissa, Emir  Biceps tendonitis, right 25-Apr-2019 16:59:13  Emir Torres  Complete tear of right rotator cuff 25-Apr-2019 16:58:59  Emir Torres

## 2019-04-25 NOTE — ASU DISCHARGE PLAN (ADULT/PEDIATRIC) - CALL YOUR DOCTOR IF YOU HAVE ANY OF THE FOLLOWING:
Fever greater than (need to indicate Fahrenheit or Celsius)/Wound/Surgical Site with redness, or foul smelling discharge or pus/Bleeding that does not stop/Numbness, tingling, color or temperature change to extremity

## 2019-04-25 NOTE — BRIEF OPERATIVE NOTE - NSICDXBRIEFPROCEDURE_GEN_ALL_CORE_FT
PROCEDURES:  Arthroscopic acromioplasty 25-Apr-2019 16:58:38  Emir Torres  Arthroscopy, shoulder, with biceps tenodesis 25-Apr-2019 16:58:31  Emir Torres  Repair, rotator cuff, arthroscopic 25-Apr-2019 16:58:23  Emir Torres

## 2019-04-25 NOTE — BRIEF OPERATIVE NOTE - NSICDXBRIEFPOSTOP_GEN_ALL_CORE_FT
POST-OP DIAGNOSIS:  Impingement syndrome, shoulder, right 25-Apr-2019 17:00:01  Emir Torres  Biceps tendonitis, right 25-Apr-2019 16:59:48  Emir Torres  Complete tear of right rotator cuff 25-Apr-2019 16:59:41  Emir Torres

## 2019-04-25 NOTE — ASU DISCHARGE PLAN (ADULT/PEDIATRIC) - ASU DC SPECIAL INSTRUCTIONSFT
- Keep wound/bandage clean, dry and intact. Return to ER if Fever of 101 F or greater develops   - Patient is to be NWB to RUE with arm kept in sling  - Patient is to follow up with Orthopedic Surgeon, Dr. Torres, in office at: [phone]

## 2019-04-25 NOTE — ASU DISCHARGE PLAN (ADULT/PEDIATRIC) - CARE PROVIDER_API CALL
Emir Torres)  Surgery  Orthopedic  [address], [address] [place] [address]  Phone: [phone]  Fax: [phone]  Follow Up Time: 1 week

## 2019-04-29 PROBLEM — G47.33 OBSTRUCTIVE SLEEP APNEA (ADULT) (PEDIATRIC): Chronic | Status: ACTIVE | Noted: 2019-04-25

## 2019-05-06 ENCOUNTER — APPOINTMENT (OUTPATIENT)
Dept: ORTHOPEDIC SURGERY | Facility: CLINIC | Age: 59
End: 2019-05-06
Payer: MEDICAID

## 2019-05-06 PROCEDURE — 99024 POSTOP FOLLOW-UP VISIT: CPT

## 2019-05-06 NOTE — HISTORY OF PRESENT ILLNESS
[de-identified] : Patient is status post Right Shoulder Arthroscopic surgery on 4-25-19\par Two anchor L-shaped supraspinatus repair\par Arthroscopic biceps tenodesis\par Subacromial decompression\par      \par The patient is doing well with improved pain postoperatively, is [ not ] taking narcotics for pain.\par They have been doing postoperative icing, compressive dressing and exercises as directed with improving swelling, pain and motion.\par They have been complaint with postoperative sling immobilization\par They are taking ASA as directed for postoperative DVT ppx, Abx ppx completed\par \par The patient denies shortness of breath, chest pain, numbness tingling, worsening calf pain or swelling.\par \par Right Upper Ext\par \par Dressing intact, foam in place\par Steri-Strips intact\par Incisions are intact\par There is no erythema induration warmth or tenderness about the incisions\par There is mild swelling remaining [ and ecchymosis ]\par Shoulder pendulum motion is slightly stiff but limited by patient apprehension/guarding\par Elbow ROM is stiff in extension\par Motor is intact AIN/PIN/Ulnar/Radial\par Sensory intact median/ulnar/radial distributions\par Palpable radial pulse with brisk capillary refill\par \par \par \par Assessment Plan\par 1.5 weeks status post Right Shoulder Arthroscopic surgery on 4-25-19\par Two anchor L-shaped supraspinatus repair\par Arthroscopic biceps tenodesis\par Subacromial decompression\par \par Steristrips removed and changed sterile\par Pendulum exercises demonstrated and practiced, do 5-10x daily\par Continue postoperative exercises\par \par WB status RUE\par      Continue NWB for 6 weeks total\par      Progress 5lbs WB for weeks 6-12\par      Advance 15lbs WB for months 3-5\par      Activities as tolerated months 5+\par \par Hold physical therapy until [ 6 ] weeks\par \par Continue icing the shoulder for pain and swelling\par Continue shower, bathing w submersion of incision ok at 2 weeks\par \par Continue ASA DVT ppx for 1 month\par Abx ppx completed\par \par Surgery and arthroscopic images reviewed and provided for patient\par \par Follow up:\par 2 weeks at 3 weeks postop

## 2019-06-03 ENCOUNTER — APPOINTMENT (OUTPATIENT)
Dept: ORTHOPEDIC SURGERY | Facility: CLINIC | Age: 59
End: 2019-06-03

## 2019-06-19 ENCOUNTER — APPOINTMENT (OUTPATIENT)
Dept: ORTHOPEDIC SURGERY | Facility: CLINIC | Age: 59
End: 2019-06-19
Payer: MEDICAID

## 2019-06-19 DIAGNOSIS — M75.21 BICIPITAL TENDINITIS, RIGHT SHOULDER: ICD-10-CM

## 2019-06-19 DIAGNOSIS — M75.121 COMPLETE ROTATOR CUFF TEAR OR RUPTURE OF RIGHT SHOULDER, NOT SPECIFIED AS TRAUMATIC: ICD-10-CM

## 2019-06-19 DIAGNOSIS — M75.41 IMPINGEMENT SYNDROME OF RIGHT SHOULDER: ICD-10-CM

## 2019-06-19 PROCEDURE — 99024 POSTOP FOLLOW-UP VISIT: CPT

## 2019-06-19 NOTE — HISTORY OF PRESENT ILLNESS
[de-identified] : Patient is status post Right Shoulder Arthroscopic surgery on 4-25-19\par Two anchor L-shaped supraspinatus repair\par Arthroscopic biceps tenodesis\par Subacromial decompression\par      \par The patient is doing well with improved pain postoperatively, is [ not ] taking narcotics for pain.\par They have been doing postoperative icing, compressive dressing and exercises as directed with improving swelling, pain and motion.\par They have been complaint with postoperative sling immobilization\par \par The patient denies shortness of breath, chest pain, numbness tingling, worsening calf pain or swelling.\par \par Right Upper Ext\par \par Incisions are intact\par There is no erythema induration warmth or tenderness about the incisions\par There is reseolved swelling remaining [ and ecchymosis ]\par Shoulder pendulum motion is slightly stiff but limited by patient apprehension/guarding\par Elbow ROM is stiff in extension\par Motor is intact AIN/PIN/Ulnar/Radial\par Sensory intact median/ulnar/radial distributions\par Palpable radial pulse with brisk capillary refill\par \par \par \par Assessment Plan\par 8 weeks status post Right Shoulder Arthroscopic surgery on 4-25-19\par Two anchor L-shaped supraspinatus repair\par Arthroscopic biceps tenodesis\par Subacromial decompression\par \par WB status RUE\par      Progress 5lbs WB for weeks 6-12\par      Advance 15lbs WB for months 3-5\par      Activities as tolerated months 5+\par \par begin PT\par \par Follow up:\par 6 weeks

## 2019-07-31 ENCOUNTER — APPOINTMENT (OUTPATIENT)
Dept: ORTHOPEDIC SURGERY | Facility: CLINIC | Age: 59
End: 2019-07-31

## 2019-08-16 ENCOUNTER — APPOINTMENT (OUTPATIENT)
Dept: ORTHOPEDIC SURGERY | Facility: CLINIC | Age: 59
End: 2019-08-16

## 2021-01-25 NOTE — H&P PST ADULT - ASSESSMENT
26-Jan-2021 01:31
57year old female with impingement syndrome of right shoulder/complete rotator cuff tear or rupture of right shoulder/bicipital tendinitis right shoulder.

## 2021-08-17 NOTE — H&P PST ADULT - GENERAL
details… Posterior Auricular Interpolation Flap Text: A decision was made to reconstruct the defect utilizing an interpolation axial flap and a staged reconstruction.  A telfa template was made of the defect.  This telfa template was then used to outline the posterior auricular interpolation flap.  The donor area for the pedicle flap was then injected with anesthesia.  The flap was excised through the skin and subcutaneous tissue down to the layer of the underlying musculature.  The pedicle flap was carefully excised within this deep plane to maintain its blood supply.  The edges of the donor site were undermined.   The donor site was closed in a primary fashion.  The pedicle was then rotated into position and sutured.  Once the tube was sutured into place, adequate blood supply was confirmed with blanching and refill.  The pedicle was then wrapped with xeroform gauze and dressed appropriately with a telfa and gauze bandage to ensure continued blood supply and protect the attached pedicle.

## 2023-08-25 NOTE — H&P PST ADULT - ACCEPTABLE
Hydroxychloroquine Pregnancy And Lactation Text: This medication has been shown to cause fetal harm but it isn't assigned a Pregnancy Risk Category. There are small amounts excreted in breast milk. 0

## 2025-01-15 NOTE — ASU PREOP CHECKLIST - WEIGHT IN LBS
201.9